# Patient Record
Sex: FEMALE | Race: WHITE | Employment: OTHER | URBAN - METROPOLITAN AREA
[De-identification: names, ages, dates, MRNs, and addresses within clinical notes are randomized per-mention and may not be internally consistent; named-entity substitution may affect disease eponyms.]

---

## 2018-12-14 ENCOUNTER — OFFICE VISIT (OUTPATIENT)
Dept: VASCULAR SURGERY | Age: 38
End: 2018-12-14
Payer: COMMERCIAL

## 2018-12-14 VITALS
WEIGHT: 130 LBS | SYSTOLIC BLOOD PRESSURE: 103 MMHG | BODY MASS INDEX: 19.7 KG/M2 | DIASTOLIC BLOOD PRESSURE: 68 MMHG | HEART RATE: 91 BPM | HEIGHT: 68 IN

## 2018-12-14 DIAGNOSIS — I78.1 SPIDER TELANGIECTASIS OF SKIN: Primary | ICD-10-CM

## 2018-12-14 PROCEDURE — 99201 PR OFFICE OUTPATIENT NEW 10 MINUTES: CPT | Performed by: SURGERY

## 2018-12-14 RX ORDER — HYDROCODONE BITARTRATE AND ACETAMINOPHEN 5; 325 MG/1; MG/1
1 TABLET ORAL EVERY 6 HOURS PRN
COMMUNITY

## 2018-12-14 ASSESSMENT — ENCOUNTER SYMPTOMS
BACK PAIN: 1
GASTROINTESTINAL NEGATIVE: 1
RESPIRATORY NEGATIVE: 1
EYES NEGATIVE: 1
ALLERGIC/IMMUNOLOGIC NEGATIVE: 1

## 2018-12-14 NOTE — PROGRESS NOTES
[facility]        New Patient Screening History and Physical  Date: [unfilled]   Name: Dianne Cervantes    /[birth year]   Pulse 91   Ht 5' 8\" (1.727 m)   Wt 130 lb (59 kg)   BMI 19.77 kg/m²   Previous [facility] pt with sclerotherapy only 10 years ago    REASON FOR VISIT: LEG(S)      LEG(S)   [] Symptomatic L R Other    L R Other  Pain   [] []     Fatigue [] []      Superficial Phlebitis [] []     Swelling [] []      Aching   [] []     Bleeding [] []      Numbness  [] []     Burning [] []      FUNCTIONAL IMPAIRMENTAFFECTING ADLS/PTS ABILITYTO WORK[]YES[]NO.   IF YES, DESCRIBE                                  [x] Cosmetic    PHYSICAL EXMINATION:  LEG(S)    FOR VARICOSE VEINS:  LESION/VEIN TYPE  L R FACE OTHER  L LEG: [] 4-7 mm  [] 8-10 mm[]>10mm  Varicose Vein   [] [] []    R LEG: [] 4-7 mm [] 8-10 mm[] >10mm  Reticular Vein (2mm-5mm) [] [] []   Other:        Spider Vein (0.2-1mm) [x] [x] []    [] 4-7 mm [] 8-10 mm [] >10mm  [] Port-Wine Stain      []Hemangioma        LOCATION:       LOCATION:        []  <10.0 cm2       SIZE:         [] 10.0 - 50 cm2   [] >50.0 cm2   NOTES:                                 PREVIOUS TREATMENT   LEG(S)  [] NO [x] YES  IF YES:  L R FACE OTHER      Sclerotherapy  [x] [x] [] CL 2008 Compression Hose []     Surgery  [] []    Pain Medication []      Laser   [] [] []   Elevation  []     Photoderm  [] []    Exercise  []     Other:                               RECOMMENDATIONS: LEG(S)      LEG(S)     L R OTHER    L R FACE    OTHER  SCLEROTHERAPY [x] [x] 1-3 tx        LASER/LIGHT TX [] [] []     DUPLEX  [] []   SURGERY  [] []  COMPRESSION HOSE []  NO FOLLOW UP []  DO NOT CALL  []  OTHER:                                   COMMENTS/NOTES: Fair skin - will use glycerin preferentially to lower risk of staining                                Danitza [place]

## 2019-03-06 ENCOUNTER — TELEPHONE (OUTPATIENT)
Dept: VASCULAR SURGERY | Age: 39
End: 2019-03-06

## 2019-04-24 ENCOUNTER — OFFICE VISIT (OUTPATIENT)
Dept: VASCULAR SURGERY | Age: 39
End: 2019-04-24

## 2019-04-24 VITALS
HEART RATE: 86 BPM | DIASTOLIC BLOOD PRESSURE: 73 MMHG | WEIGHT: 130 LBS | BODY MASS INDEX: 19.7 KG/M2 | SYSTOLIC BLOOD PRESSURE: 107 MMHG | HEIGHT: 68 IN

## 2019-04-24 DIAGNOSIS — I78.1 SPIDER TELANGIECTASIS OF SKIN: Primary | ICD-10-CM

## 2019-04-24 PROCEDURE — 36468 NJX SCLRSNT SPIDER VEINS: CPT | Performed by: SURGERY

## 2019-04-24 NOTE — PROGRESS NOTES
SCLEROTHERAPY office visit      PRIOR TO TREATMENT confirmed that patient understood the planned procedure and had all questions answered. This therapy was discussed in terms that the patient could understand. The risks of sclerotherapy, including possible allergic reaction, hyperpigmentation, venous matting and skin ulceration, possibly resulting in scarring. The patient is aware that it may take multiple treatment sessions to achieve the desired results. right left   SPIDERS Calf/thigh Calf/thigh   RETICULAR pop pop   VARICOSE           0.2% PD  5  syringes =  15  cc  LOCATION  As above    0.5% STD    syringes =    cc  LOCATION      Glycerin    syringes =    cc  LOCATION          __x_Patient tolerated procedure well without any allergic reaction or complications    __x_Compression Hose Applied    __x_Post-Procedure Questions Answered      FOLLOW UP:  4-6 weeks.  ? microthrombctomies     Next sclero will have one syringe credit due to incomplete treatment usage (only 5 syringes)

## 2020-12-11 ENCOUNTER — HOSPITAL ENCOUNTER (EMERGENCY)
Age: 40
Discharge: HOME | End: 2020-12-11
Attending: EMERGENCY MEDICINE
Payer: COMMERCIAL

## 2020-12-11 ENCOUNTER — APPOINTMENT (OUTPATIENT)
Dept: CT IMAGING | Age: 40
End: 2020-12-11
Payer: COMMERCIAL

## 2020-12-11 VITALS
SYSTOLIC BLOOD PRESSURE: 107 MMHG | RESPIRATION RATE: 16 BRPM | DIASTOLIC BLOOD PRESSURE: 65 MMHG | TEMPERATURE: 98 F | OXYGEN SATURATION: 100 % | HEART RATE: 87 BPM | WEIGHT: 137.7 LBS

## 2020-12-11 LAB
A/G RATIO: 2.2 (ref 1.1–2.2)
ALBUMIN SERPL-MCNC: 4.7 G/DL (ref 3.4–5)
ALP BLD-CCNC: 32 U/L (ref 40–129)
ALT SERPL-CCNC: 18 U/L (ref 10–40)
ANION GAP SERPL CALCULATED.3IONS-SCNC: 11 MMOL/L (ref 3–16)
AST SERPL-CCNC: 15 U/L (ref 15–37)
BASOPHILS ABSOLUTE: 0 K/UL (ref 0–0.2)
BASOPHILS RELATIVE PERCENT: 0.3 %
BILIRUB SERPL-MCNC: 0.5 MG/DL (ref 0–1)
BUN BLDV-MCNC: 14 MG/DL (ref 7–20)
CALCIUM SERPL-MCNC: 9.5 MG/DL (ref 8.3–10.6)
CHLORIDE BLD-SCNC: 104 MMOL/L (ref 99–110)
CO2: 25 MMOL/L (ref 21–32)
CREAT SERPL-MCNC: 0.7 MG/DL (ref 0.6–1.1)
EOSINOPHILS ABSOLUTE: 0.2 K/UL (ref 0–0.6)
EOSINOPHILS RELATIVE PERCENT: 2 %
GFR AFRICAN AMERICAN: >60
GFR NON-AFRICAN AMERICAN: >60
GLOBULIN: 2.1 G/DL
GLUCOSE BLD-MCNC: 99 MG/DL (ref 70–99)
HCG QUALITATIVE: NEGATIVE
HCT VFR BLD CALC: 37.7 % (ref 36–48)
HEMOGLOBIN: 12.7 G/DL (ref 12–16)
INR BLD: 1.11 (ref 0.86–1.14)
LYMPHOCYTES ABSOLUTE: 2 K/UL (ref 1–5.1)
LYMPHOCYTES RELATIVE PERCENT: 19.4 %
MCH RBC QN AUTO: 32.6 PG (ref 26–34)
MCHC RBC AUTO-ENTMCNC: 33.8 G/DL (ref 31–36)
MCV RBC AUTO: 96.5 FL (ref 80–100)
MONOCYTES ABSOLUTE: 0.6 K/UL (ref 0–1.3)
MONOCYTES RELATIVE PERCENT: 5.7 %
NEUTROPHILS ABSOLUTE: 7.4 K/UL (ref 1.7–7.7)
NEUTROPHILS RELATIVE PERCENT: 72.6 %
PDW BLD-RTO: 12.5 % (ref 12.4–15.4)
PLATELET # BLD: 215 K/UL (ref 135–450)
PMV BLD AUTO: 9.1 FL (ref 5–10.5)
POTASSIUM SERPL-SCNC: 4.2 MMOL/L (ref 3.5–5.1)
PROTHROMBIN TIME: 12.9 SEC (ref 10–13.2)
RBC # BLD: 3.91 M/UL (ref 4–5.2)
SODIUM BLD-SCNC: 140 MMOL/L (ref 136–145)
TOTAL PROTEIN: 6.8 G/DL (ref 6.4–8.2)
WBC # BLD: 10.1 K/UL (ref 4–11)

## 2020-12-11 PROCEDURE — 80053 COMPREHEN METABOLIC PANEL: CPT

## 2020-12-11 PROCEDURE — 99283 EMERGENCY DEPT VISIT LOW MDM: CPT

## 2020-12-11 PROCEDURE — 70450 CT HEAD/BRAIN W/O DYE: CPT

## 2020-12-11 PROCEDURE — 6360000004 HC RX CONTRAST MEDICATION: Performed by: EMERGENCY MEDICINE

## 2020-12-11 PROCEDURE — 96374 THER/PROPH/DIAG INJ IV PUSH: CPT

## 2020-12-11 PROCEDURE — 6360000002 HC RX W HCPCS: Performed by: EMERGENCY MEDICINE

## 2020-12-11 PROCEDURE — 96375 TX/PRO/DX INJ NEW DRUG ADDON: CPT

## 2020-12-11 PROCEDURE — 85025 COMPLETE CBC W/AUTO DIFF WBC: CPT

## 2020-12-11 PROCEDURE — 84703 CHORIONIC GONADOTROPIN ASSAY: CPT

## 2020-12-11 PROCEDURE — 85610 PROTHROMBIN TIME: CPT

## 2020-12-11 PROCEDURE — 2580000003 HC RX 258: Performed by: EMERGENCY MEDICINE

## 2020-12-11 PROCEDURE — 70496 CT ANGIOGRAPHY HEAD: CPT

## 2020-12-11 RX ORDER — 0.9 % SODIUM CHLORIDE 0.9 %
1000 INTRAVENOUS SOLUTION INTRAVENOUS ONCE
Status: COMPLETED | OUTPATIENT
Start: 2020-12-11 | End: 2020-12-11

## 2020-12-11 RX ORDER — DIPHENHYDRAMINE HYDROCHLORIDE 50 MG/ML
25 INJECTION INTRAMUSCULAR; INTRAVENOUS ONCE
Status: COMPLETED | OUTPATIENT
Start: 2020-12-11 | End: 2020-12-11

## 2020-12-11 RX ORDER — KETOROLAC TROMETHAMINE 30 MG/ML
30 INJECTION, SOLUTION INTRAMUSCULAR; INTRAVENOUS ONCE
Status: COMPLETED | OUTPATIENT
Start: 2020-12-11 | End: 2020-12-11

## 2020-12-11 RX ORDER — BUTALBITAL, ACETAMINOPHEN AND CAFFEINE 50; 325; 40 MG/1; MG/1; MG/1
1 TABLET ORAL EVERY 6 HOURS PRN
Qty: 12 TABLET | Refills: 0 | Status: SHIPPED | OUTPATIENT
Start: 2020-12-11

## 2020-12-11 RX ORDER — METOCLOPRAMIDE HYDROCHLORIDE 5 MG/ML
10 INJECTION INTRAMUSCULAR; INTRAVENOUS ONCE
Status: COMPLETED | OUTPATIENT
Start: 2020-12-11 | End: 2020-12-11

## 2020-12-11 RX ORDER — METOCLOPRAMIDE 10 MG/1
10 TABLET ORAL EVERY 6 HOURS PRN
Qty: 15 TABLET | Refills: 0 | Status: SHIPPED | OUTPATIENT
Start: 2020-12-11 | End: 2020-12-21

## 2020-12-11 RX ADMIN — SODIUM CHLORIDE 1000 ML: 9 INJECTION, SOLUTION INTRAVENOUS at 09:14

## 2020-12-11 RX ADMIN — IOPAMIDOL 80 ML: 755 INJECTION, SOLUTION INTRAVENOUS at 10:00

## 2020-12-11 RX ADMIN — DIPHENHYDRAMINE HYDROCHLORIDE 25 MG: 50 INJECTION, SOLUTION INTRAMUSCULAR; INTRAVENOUS at 09:14

## 2020-12-11 RX ADMIN — KETOROLAC TROMETHAMINE 30 MG: 30 INJECTION, SOLUTION INTRAMUSCULAR at 11:33

## 2020-12-11 RX ADMIN — METOCLOPRAMIDE 10 MG: 5 INJECTION, SOLUTION INTRAMUSCULAR; INTRAVENOUS at 09:14

## 2020-12-11 ASSESSMENT — PAIN SCALES - GENERAL
PAINLEVEL_OUTOF10: 1
PAINLEVEL_OUTOF10: 4
PAINLEVEL_OUTOF10: 8
PAINLEVEL_OUTOF10: 4

## 2020-12-11 ASSESSMENT — PAIN DESCRIPTION - DESCRIPTORS: DESCRIPTORS: THROBBING

## 2020-12-11 ASSESSMENT — PAIN DESCRIPTION - LOCATION: LOCATION: HEAD

## 2020-12-11 ASSESSMENT — PAIN DESCRIPTION - PAIN TYPE: TYPE: ACUTE PAIN

## 2020-12-11 NOTE — ED PROVIDER NOTES
Rfl:     ETODOLAC PO, Take  by mouth., Disp: , Rfl:     oxycodone-acetaminophen (PERCOCET) 5-325 MG per tablet, Take 1 Tab by mouth every 4 hours as needed. , Disp: , Rfl:     FISH OIL, by Does not apply route., Disp: , Rfl:     Multiple Vitamin (MULTIVITAMIN PO), Take  by mouth., Disp: , Rfl:     Allergies   Allergen Reactions    Sulfa Antibiotics        Social History     Socioeconomic History    Marital status:      Spouse name: Not on file    Number of children: Not on file    Years of education: Not on file    Highest education level: Not on file   Occupational History    Not on file   Social Needs    Financial resource strain: Not on file    Food insecurity     Worry: Not on file     Inability: Not on file    Transportation needs     Medical: Not on file     Non-medical: Not on file   Tobacco Use    Smoking status: Never Smoker    Smokeless tobacco: Never Used   Substance and Sexual Activity    Alcohol use: Yes     Comment: 1 drink daily    Drug use: No    Sexual activity: Not on file   Lifestyle    Physical activity     Days per week: Not on file     Minutes per session: Not on file    Stress: Not on file   Relationships    Social connections     Talks on phone: Not on file     Gets together: Not on file     Attends Voodoo service: Not on file     Active member of club or organization: Not on file     Attends meetings of clubs or organizations: Not on file     Relationship status: Not on file    Intimate partner violence     Fear of current or ex partner: Not on file     Emotionally abused: Not on file     Physically abused: Not on file     Forced sexual activity: Not on file   Other Topics Concern    Not on file   Social History Narrative    Not on file       Nursing Notes Reviewed      ROS:  GENERAL:  No fever, no chills, no diaphoresis, no appetite changes  EYES: no eye discharge, no eye redness, no visual changes  ENT: no nasal congestion, no sore throat  CARDIAC: no chest pain,  no leg swelling  PULM: no cough, no shortness of breath  ABD: no abdominal pain, +Nausea, + vomiting, no diarrhea  : no dysuria, no hematuria, no urgency, no frequency. No flank pain  MUSCULOSKELETAL: no back pain, no arthralgias, no myalgias  NEURO: + headache, no lightheadedness, no dizziness, no numbness, no weakness, no syncope, no confusion, no speech difficulty  SKIN: no rashes, no erythema, no wounds, no ecchymosis      PHYSICAL EXAM:  GENERAL APPEARANCE: Nancy Layne is in no acute respiratory distress. Awake and alert. VITAL SIGNS:   ED Triage Vitals [12/11/20 0840]   Enc Vitals Group      BP (!) 146/106      Pulse 110      Resp 17      Temp 98 °F (36.7 °C)      Temp Source Oral      SpO2 100 %      Weight 137 lb 11.2 oz (62.5 kg)      Height       Head Circumference       Peak Flow       Pain Score       Pain Loc       Pain Edu? Excl. in [address]? HEAD: Normocephalic, atraumatic. EYES:  Extraocular muscles are intact. Pupils equal round and reactive to light. Conjunctivas are pink. Negative scleral icterus. ENT:  Mucous membranes are moist.  Pharynx without erythema or exudates. NECK: Nontender and supple. No cervical adenopathy. NO MENINGISMUS  CHEST:  Clear to auscultation bilaterally. No rales, rhonchi, or wheezing. HEART:  Regular rate and regular rhythm. No murmurs. Strong and equal pulses in the upper and lower extremities. ABDOMEN: Soft,  nondistended, positive bowel sounds. abdomen is nontender. No rebound. no guarding. MUSCULOSKELETAL: The calves are nontender to palpation. Active range of motion of the upper and lower extremities. No edema. NEUROLOGICAL: Awake, alert and oriented x 3. Power intact in the upper and lower extremities. Sensation is intact to light touch in the upper and lower extremities. Cranial Nerves 2-12 are intact. No truncal ataxia. No dysarthria or aphasia. Normal finger to nose. DERMATOLOGIC: No petechiae, rashes, or ecchymoses. No erythema. PSYCH: normal mood and affect. Normal thought content. ED COURSE AND MEDICAL DECISION MAKING:      Radiology:  Films have been read by radiologist as noted in chart unless otherwise stated. Other radiologic studies (i.e. CT, MRI, ultrasounds, etc ) have been interpreted by radiologist.     [id number]   Final Result      No significant abnormality. CT HEAD WO CONTRAST   Final Result      Normal study.                 Labs:  Results for orders placed or performed during the hospital encounter of 12/11/20   CBC Auto Differential   Result Value Ref Range    WBC 10.1 4.0 - 11.0 K/uL    RBC 3.91 (L) 4.00 - 5.20 M/uL    Hemoglobin 12.7 12.0 - 16.0 g/dL    Hematocrit 37.7 36.0 - 48.0 %    MCV 96.5 80.0 - 100.0 fL    MCH 32.6 26.0 - 34.0 pg    MCHC 33.8 31.0 - 36.0 g/dL    RDW 12.5 12.4 - 15.4 %    Platelets 425 930 - 135 K/uL    MPV 9.1 5.0 - 10.5 fL    Neutrophils % 72.6 %    Lymphocytes % 19.4 %    Monocytes % 5.7 %    Eosinophils % 2.0 %    Basophils % 0.3 %    Neutrophils Absolute 7.4 1.7 - 7.7 K/uL    Lymphocytes Absolute 2.0 1.0 - 5.1 K/uL    Monocytes Absolute 0.6 0.0 - 1.3 K/uL    Eosinophils Absolute 0.2 0.0 - 0.6 K/uL    Basophils Absolute 0.0 0.0 - 0.2 K/uL   Comprehensive Metabolic Panel   Result Value Ref Range    Sodium 140 136 - 145 mmol/L    Potassium 4.2 3.5 - 5.1 mmol/L    Chloride 104 99 - 110 mmol/L    CO2 25 21 - 32 mmol/L    Anion Gap 11 3 - 16    Glucose 99 70 - 99 mg/dL    BUN 14 7 - 20 mg/dL    CREATININE 0.7 0.6 - 1.1 mg/dL    GFR Non-African American >60 >60    GFR African American >60 >60    Calcium 9.5 8.3 - 10.6 mg/dL    Total Protein 6.8 6.4 - 8.2 g/dL    Alb 4.7 3.4 - 5.0 g/dL    Albumin/Globulin Ratio 2.2 1.1 - 2.2    Total Bilirubin 0.5 0.0 - 1.0 mg/dL    Alkaline Phosphatase 32 (L) 40 - 129 U/L    ALT 18 10 - 40 U/L    AST 15 15 - 37 U/L    Globulin 2.1 g/dL   HCG Qualitative, Serum   Result Value Ref Range    hCG Qual Negative Detects HCG level >10 MIU/mL GLAUCOMA, thus I consider the discharge disposition reasonable. Tawanda Ricketts and I have discussed the diagnosis and risks, and we agree with discharging home to follow-up with their primary doctor. We also discussed returning to the Emergency Department immediately if new or worsening symptoms occur. We have discussed the symptoms which are most concerning (e.g., changing or worsening pain, weakness, vomiting, fever, mental status changes, speech difficulty, fainting) that necessitate immediate return. Clinical Impression:  1. Acute nonintractable headache, unspecified headache type    2. Non-intractable vomiting with nausea, unspecified vomiting type        Dispo:  Patient will be discharged  at this time. Patient was informed of this decision and agrees with plan. I have discussed lab and xray findings with patient and they understand. Questions were answered to the best of my ability. Discharge vitals:  Blood pressure 107/65, pulse 87, temperature 98 °F (36.7 °C), temperature source Oral, resp. rate 16, weight 137 lb 11.2 oz (62.5 kg), last menstrual period 11/19/2020, SpO2 100 %. Prescriptions given:   Discharge Medication List as of 12/11/2020 12:12 PM      START taking these medications    Details   metoclopramide (REGLAN) 10 MG tablet Take 1 tablet by mouth every 6 hours as needed (nauseaa and vomiting), Disp-15 tablet,R-0Print      butalbital-acetaminophen-caffeine (FIORICET, ESGIC) -40 MG per tablet Take 1 tablet by mouth every 6 hours as needed for Headaches, Disp-12 tablet,R-0Print               This chart was created using Dragon voice recognition software.         Balaji Aguilar MD  12/11/20 [id number]

## 2020-12-11 NOTE — ED TRIAGE NOTES
Patient c/o headache since yesterday. Vomited once. Had same headache a week ago and lasted short time. Some photosensitivity. Patient states she's been under more pressure at work lately and hasn't been sleeping well.